# Patient Record
Sex: FEMALE | Race: WHITE
[De-identification: names, ages, dates, MRNs, and addresses within clinical notes are randomized per-mention and may not be internally consistent; named-entity substitution may affect disease eponyms.]

---

## 2021-01-18 ENCOUNTER — HOSPITAL ENCOUNTER (INPATIENT)
Dept: HOSPITAL 56 - MW.OBCHECK | Age: 31
LOS: 2 days | Discharge: HOME | End: 2021-01-20
Attending: OBSTETRICS & GYNECOLOGY | Admitting: OBSTETRICS & GYNECOLOGY
Payer: COMMERCIAL

## 2021-01-18 DIAGNOSIS — Z3A.40: ICD-10-CM

## 2021-01-18 DIAGNOSIS — E66.9: ICD-10-CM

## 2021-01-18 DIAGNOSIS — K21.9: ICD-10-CM

## 2021-01-18 DIAGNOSIS — U07.1: ICD-10-CM

## 2021-01-18 PROCEDURE — 00HU33Z INSERTION OF INFUSION DEVICE INTO SPINAL CANAL, PERCUTANEOUS APPROACH: ICD-10-PCS | Performed by: OBSTETRICS & GYNECOLOGY

## 2021-01-18 PROCEDURE — 3E0R3BZ INTRODUCTION OF ANESTHETIC AGENT INTO SPINAL CANAL, PERCUTANEOUS APPROACH: ICD-10-PCS | Performed by: OBSTETRICS & GYNECOLOGY

## 2021-01-18 PROCEDURE — 3E033VJ INTRODUCTION OF OTHER HORMONE INTO PERIPHERAL VEIN, PERCUTANEOUS APPROACH: ICD-10-PCS | Performed by: OBSTETRICS & GYNECOLOGY

## 2021-01-18 RX ADMIN — Medication PRN TUBE: at 20:58

## 2021-01-18 NOTE — OR
SURGEON:

Stormy Amaya MD

 

DATE OF PROCEDURE:  2021

 

[id number] #[id number] date of procedure 2021.

 

PREOPERATIVE DIAGNOSES:

1. A 30-year-old,  5, para 1-0-3-1 at 40 weeks and 3 days' gestation.

2. Elective induction of labor.

3. Group B Streptococcus positive.

4. Obesity.

5. COVID positive on admission testing.

 

POSTOPERATIVE DIAGNOSES:

1. A 30-year-old,  5, para 2-0-3-2, at 40 weeks and 3 days' gestation.

2. Group B Streptococcus positive.

3. Obesity.

4. COVID positive.

 

PROCEDURE:

Spontaneous vaginal delivery.

 

PRIMARY SURGEON:

Stormy Amaya MD.

 

ANESTHESIA:

Epidural.

 

ESTIMATED BLOOD LOSS:

200 mL.

 

FINDINGS:

Live female infant in cephalic presentation.  Apgar score of 8 and 9 at one and

five minutes respectively.  Placenta intact with 3-vessel cord.  No perineal

lacerations.

 

INDICATIONS:

This is a 30-year-old, G5, P 1-0-3-1, who presented at 40 weeks and 3 days'

gestation for elective induction of labor.  Upon presentation, the cervix was

found to be 2 cm dilated.  Ampicillin was started for GBS prophylaxis, and

Pitocin was started for induction of labor.  The patient progressed to 3 to 4 cm

dilated and received an epidural for pain control.  She had spontaneous rupture

of membranes shortly thereafter with clear fluid noted.  The patient quickly

progressed to complete cervical dilation, and I was called to the room.

 

DESCRIPTION OF PROCEDURE:

I arrived to the room with fetal head at +1 station.  Over the next

contractions, the patient pushed and delivered a live female infant.  Head was

delivered followed quickly by the shoulders and remainder of the body.  Nuchal

cord x1 was reduced after delivery of the body.  The infant was placed on the

maternal abdomen.  After approximately 60 seconds, the cord was clamped and cut.

Cord blood was obtained.  The placenta then delivered intact x3 vessel cord.

The perineum was inspected and no lacerations were noted.  The fundus was firm

below the umbilicus.  The infant and the patient tolerated the delivery well.

 

 

[id number] / MODL

DD:  2021 19:20:19

DT:  2021 20:52:34

Job #:  [id number]

## 2021-01-18 NOTE — PCM.DEL
L & D Note





- General Info


Date of Service: 21


Mother's Due Date: 01/15/21





- Delivery Note


Labor: Induced by Oxytocin


Delivery Outcome: Livebirth


Infant Delivery Method: Spontaneous Vaginal Delivery-Single


Birth Presentation: Vertex


Nuchal Cord: Present, Reduced (after delivery of body)


Anesthesia Type: Epidural


Amniotic Fluid Description: Clear


Episiotomy Type: None


Laceration: None


Placenta: Intact, Spontaneous


Cord: 3 Vessels


Estimated Blood Loss: 300


Resuscitation Needed: No


: Suctioned, Bulb Syringe, Stimulated, Lynd Used


APGAR Score 1 min: 8


APGAR Score 5 min: 9


Delivery Comments (Free Text/Narrative):: 





Live female, weight pending, Namita





- General Info


Date of Service: 21





- Patient Data


Weight - Most Recent: 103.419 kg


I&O - Last 24 Hours: 


                                 Intake & Output











 21





 06:59 14:59 22:59


 


Intake Total  1150 2250


 


Balance  1150 2250











Lab Results Last 24 Hours: 


                         Laboratory Results - last 24 hr











  21 Range/Units





  08:46 08:48 


 


WBC   10.36  (4.0-11.0)  K/uL


 


RBC   4.40  (4.30-5.90)  M/uL


 


Hgb   12.2  (12.0-16.0)  g/dL


 


Hct   37.9  (36.0-46.0)  %


 


MCV   86.1  (80.0-98.0)  fL


 


MCH   27.7  (27.0-32.0)  pg


 


MCHC   32.2  (31.0-37.0)  g/dL


 


RDW Std Deviation   45.1  (28.0-62.0)  fl


 


RDW Coeff of Sallie   15  (11.0-15.0)  %


 


Plt Count   192  (150-400)  K/uL


 


MPV   12.80 H  (7.40-12.00)  fL


 


Nucleated RBC %   0.0  /100WBC


 


Nucleated RBCs #   0  K/uL


 


Blood Type  A POSITIVE   


 


Antibody Screen  NEGATIVE   











Med Orders - Current: 


                               Current Medications





Acetaminophen (Tylenol Extra Strength)  1,000 mg PO Q6H PRN


   PRN Reason: Pain


Benzocaine/Menthol (Dermoplast Pain Relief 20%-0.5% Spray)  78 gm TOP ASDIRECTED

PRN


   PRN Reason: Perineal Comfort Measure


Bisacodyl (Dulcolax)  10 mg RECTAL ONETIME PRN


   PRN Reason: Constipation


Bupropion HCl (Wellbutrin)  100 mg PO BID [facility]


Butorphanol Tartrate (Stadol)  1 mg IVPUSH Q1H PRN


   PRN Reason: Pain


Calcium Carbonate/Glycine (Tums)  1,000 mg PO Q2HR PRN


   PRN Reason: Indigestion


   Last Admin: 21 10:11 Dose:  1,000 mg


   Documented by: 


Carboprost Tromethamine (Hemabate Ds)  250 mcg IM ASDIRECTED PRN


   PRN Reason: Post Partum Hemorrhage


Docusate Sodium (Colace)  100 mg PO BID PRN


   PRN Reason: Constipation


Emollient Ointment (Lansinoh Hpa)  0 gm TOP ASDIRECTED PRN


   PRN Reason: Sore Nipples


Oxytocin/Sodium Chloride (Oxytocin 30 Unit/500 Ml-Ns)  30 unit in 500 mls @ 500 

mls/hr IV TITRATE [facility]


Tranexamic Acid 1,000 mg/ (Sodium Chloride)  110 mls @ 660 mls/hr IV ONETIME PRN


   PRN Reason: Bleeding


Oxytocin/Sodium Chloride (Oxytocin 30 Unit/500 Ml-Ns)  30 unit in 500 mls @ 2 

mls/hr IV TITRATE [facility]; Protocol


   Last Titration: 21 18:58 Dose:  250 munits/min, 250 mls/hr


   Documented by: 


Lactated Ringer's (Ringers, Lactated)  1,000 mls @ 150 mls/hr IV ASDIRECTED [facility]


   Last Admin: 21 17:08 Dose:  150 mls/hr


   Documented by: 


Ampicillin Sodium 1 gm/ Sodium (Chloride)  50 mls @ 100 mls/hr IV Q4H [facility]


   Last Admin: 21 17:15 Dose:  100 mls/hr


   Documented by: 


Ibuprofen (Motrin)  800 mg PO Q8H PRN


   PRN Reason: Pain


Lidocaine HCl (Xylocaine 1%)  50 ml INJECT ONETIME PRN


   PRN Reason: Laceration repair


Methylergonovine Maleate (Methergine)  0.2 mg IM ASDIRECTED PRN


   PRN Reason: Post Partum Hemorrhage


Misoprostol (Cytotec)  200 mcg PO ONETIME PRN


   PRN Reason: Post Partum Hemorrhage


Ondansetron HCl (Zofran)  4 mg IVPUSH Q6H PRN


   PRN Reason: Nausea/Vomiting


   Last Admin: 21 15:25 Dose:  4 mg


   Documented by: 


Oxycodone HCl (Oxycodone)  5 mg PO Q2H PRN


   PRN Reason: Pain


Sodium Chloride (Saline Flush)  10 ml FLUSH ASDIRECTED PRN


   PRN Reason: Keep Vein Open


Sodium Chloride (Saline Flush)  2.5 ml FLUSH ASDIRECTED PRN


   PRN Reason: Keep Vein Open


Sodium Chloride (Normal Saline)  10 ml IV ASDIRECTED PRN


   PRN Reason: IV Use


Sterile Water (Sterile Water For Irrigation)  1,000 ml IRR ASDIRECTED PRN


   PRN Reason: delivery


Terbutaline Sulfate (Brethine)  0.25 mg SUBCUT ASDIRECTED PRN


   PRN Reason: Tacysystole


Witch Hazel (Tucks)  1 pad TOP ASDIRECTED PRN


   PRN Reason: comfort care





Discontinued Medications





Ampicillin Sodium (Ampicillin) Confirm Administered Dose 2 gm IV .STK-MED ONE


   Stop: 21 08:25


Calcium Carbonate/Glycine (Tums) Confirm Administered Dose 1,000 mg .ROUTE .STK-

MED ONE


   Stop: 21 10:03


Ampicillin Sodium 2 gm/ Sodium (Chloride)  100 mls @ 200 mls/hr IV ONETIME ONE


   Stop: 21 09:29


   Last Admin: 21 09:11 Dose:  200 mls/hr


   Documented by: 


Sodium Chloride (Normal Saline) Confirm Administered Dose 100 mls @ as directed 

.ROUTE .STK-MED ONE


   Stop: 21 08:25


Oxytocin/Sodium Chloride (Oxytocin 30 Unit/500 Ml-Ns) Confirm Administered Dose 

30 unit in 500 mls @ as directed .ROUTE .STK-MED ONE


   Stop: 21 08:26


Fentanyl/Bupivacaine HCl (Fentanyl/Bupivacaine/Ns 2 Mcg-0.125% 250 Ml) Confirm 

Administered Dose 250 mls @ as directed .ROUTE .STK-MED ONE


   Stop: 21 14:08











- Problem List & Annotations


(1) Vaginal delivery


SNOMED Code(s): 959227899


   Code(s): O80 - ENCOUNTER FOR FULL-TERM UNCOMPLICATED DELIVERY   Status: Acute

  Current Visit: Yes   





- Problem List Review


Problem List Initiated/Reviewed/Updated: Yes





- My Orders


Last 24 Hours: 


My Active Orders





21 08:14


Patient Status [ADT] Routine 


Bedrest Bathroom Privileges [RC] ASDIRECTED 


Communication Order [RC] ASDIRECTED 


Communication Order [RC] ASDIRECTED 


Fetal Heart Tones [RC] CONTINUOUS 


Fetal Non Stress Test [RC] PER UNIT ROUTINE 


May Shower [RC] ASDIRECTED 


Notify Provider [RC] PRN 


Notify Provider [RC] PRN 


Notify Provider [RC] STAT 


Oxygen Therapy [RC] ASDIRECTED 


Up ad Haleigh [RC] ASDIRECTED 


Vaginal Exam [RC] PRN 


Vaginal Exam [RC] PRN 


Vital Signs [RC] PER UNIT ROUTINE 


Vital Signs [RC] PER UNIT ROUTINE 


Butorphanol [Stadol]   1 mg IVPUSH Q1H PRN 


Carboprost Tromethamine [Hemabate DS]   250 mcg IM ASDIRECTED PRN 


Lidocaine 1% [Xylocaine 1%]   50 ml INJECT ONETIME PRN 


Methylergonovine [Methergine]   0.2 mg IM ASDIRECTED PRN 


Ondansetron [Zofran]   4 mg IVPUSH Q6H PRN 


Sodium Chloride 0.9% [Normal Saline]   10 ml IV ASDIRECTED PRN 


Sodium Chloride 0.9% [Saline Flush]   10 ml FLUSH ASDIRECTED PRN 


Sodium Chloride 0.9% [Saline Flush]   2.5 ml FLUSH ASDIRECTED PRN 


Terbutaline [Brethine]   0.25 mg SUBCUT ASDIRECTED PRN 


Tranexamic Acid [Cyklokapron] 1,000 mg   Sodium Chloride 0.9% [Normal Saline] 

100 ml IV ONETIME 


Water For Irrigation,Sterile [Sterile Water for Irrigation]   1,000 ml IRR 

ASDIRECTED PRN 


miSOPROStoL [Cytotec]   200 mcg PO ONETIME PRN 


Fetal Scalp Electrode [WOMSER] Per Unit Routine 


Peripheral IV Insertion Adult [OM.PC] Routine 


Resuscitation Status Routine 





21 08:15


Lactated Ringers [Ringers, Lactated] 1,000 ml IV ASDIRECTED 


Oxytocin/0.9 % Sodium Chloride [Oxytocin 30 Unit/500 ML-NS] 30 unit in 500 ml IV

TITRATE 


Oxytocin/0.9 % Sodium Chloride [Oxytocin 30 Unit/500 ML-NS] 30 unit in 500 ml IV

TITRATE 


Medication Administration Instruction [OM.PC] Q3H 





21 08:48


RPR (SYPHILIS SERO) W/ RFLX [REF] Routine 





21 09:54


Calcium Carbonate [Tums]   1,000 mg PO Q2HR PRN 





21 10:00


Ampicillin 1 gm   Sodium Chloride 0.9% [Normal Saline] 50 ml IV Q4H 





21 Dinner


Regular Diet [DIET] 





21 19:10


Patient Status [ADT] Routine 


May Shower [RC] ASDIRECTED 


Up ad Haleigh [RC] ASDIRECTED 


Vital Signs [RC] PER UNIT ROUTINE 


Acetaminophen [Tylenol Extra Strength]   1,000 mg PO Q6H PRN 


Benzocaine/Menthol [Dermoplast Pain Relief 20%-0.5% Spray]   78 gm TOP 

ASDIRECTED PRN 


Docusate Sodium [Colace]   100 mg PO BID PRN 


Ibuprofen [Motrin]   800 mg PO Q8H PRN 


Lanolin [Lansinoh HPA]   See Dose Instructions  TOP ASDIRECTED PRN 


bisacodyL [Dulcolax]   10 mg RECTAL ONETIME PRN 


oxyCODONE   5 mg PO Q2H PRN 


witch hazeL [Tucks]   1 pad TOP ASDIRECTED PRN 


Assess Lochia [WOMSER] Per Unit Routine 


Assess Uterine Involution [WOMSER] Per Unit Routine 


Breast Pump [WOMSER] Per Unit Routine 


Ice Therapy [OM.PC] Per Unit Routine 


Perineal Care [OM.PC] Per Unit Routine 


Peripheral IV Discontinue [OM.PC] Routine 


Sitz Bath [OM.PC] Per Unit Routine 





21 19:11


Cooling Warming Measures [RC] ASDIRECTED 





21 21:00


buPROPion [Wellbutrin]   100 mg PO BID 





21 05:11


HEMOGLOBIN/HEMATOCRIT,HH [HEME] Timed 














- Assessment


Assessment:: 





31yo  s/p  at 40w3d





- Plan


Plan:: 





Admit to postpartum unit for routine care.


GBS positive, received adequate antibiotic prophylaxis.


COVID positive.


Continue Wellbutrin.

## 2021-01-18 NOTE — PCM.PREANE
Preanesthetic Assessment





- Procedure


Proposed Procedure: 


Continuous Labor Epidural





- Anesthesia/Transfusion/Family Hx


Anesthesia History: Prior Anesthesia Without Reaction


Transfusion History: No Prior Transfusion(s)





- Review of Systems


General: No Symptoms


Pulmonary: No Symptoms


Cardiovascular: No Symptoms


Gastrointestinal: No Symptoms


Neurological: No Symptoms


Other: Reports: None





- Physical Assessment


Height: 5 ft 0.75 in


Weight: 103.419 kg


ASA Class: 3


Mental Status: Alert & Oriented x3


Airway Class: Mallampati = 1


Dentition: Reports: Normal Dentition


Thyro-Mental Finger Breadths: 3


Mouth Opening Finger Breadths: 3


ROM/Head Extension: Full


Lungs: Clear to Auscultation, Normal Respiratory Effort


Cardiovascular: Regular Rate, Regular Rhythm





- Lab


Values: 





                             Laboratory Last Values











WBC  10.36 K/uL (4.0-11.0)   21  08:48    


 


RBC  4.40 M/uL (4.30-5.90)   21  08:48    


 


Hgb  12.2 g/dL (12.0-16.0)   21  08:48    


 


Hct  37.9 % (36.0-46.0)   21  08:48    


 


MCV  86.1 fL (80.0-98.0)   21  08:48    


 


MCH  27.7 pg (27.0-32.0)   21  08:48    


 


MCHC  32.2 g/dL (31.0-37.0)   21  08:48    


 


RDW Std Deviation  45.1 fl (28.0-62.0)   21  08:48    


 


RDW Coeff of Sallie  15 % (11.0-15.0)   21  08:48    


 


Plt Count  192 K/uL (150-400)   21  08:48    


 


MPV  12.80 fL (7.40-12.00)  H  21  08:48    


 


Nucleated RBC %  0.0 /100WBC  21  08:48    


 


Nucleated RBCs #  0 K/uL  21  08:48    


 


Blood Type  A POSITIVE   21  08:46    


 


Antibody Screen  NEGATIVE   21  08:46    














- Allergies


Allergies/Adverse Reactions: 


                                    Allergies











Allergy/AdvReac Type Severity Reaction Status Date / Time


 


No Known Allergies Allergy   Verified 21 08:13














- Anesthesia Plan


Free Text/Narrative:: 


Continuous Labor Epidural





Pre-Op Medication Ordered: None





- Acknowledgements


Anesthesia Type Planned: Epidural


Pt an Appropriate Candidate for the Planned Anesthesia: Yes


Alternatives and Risks of Anesthesia Discussed w Pt/Guardian: Yes


Pt/Guardian Understands and Agrees with Anesthesia Plan: Yes





PreAnesthesia Questionnaire





- Past Health History


Medical/Surgical History: Denies Medical/Surgical History


HEENT History: Reports: None


Cardiovascular History: Reports: None


Respiratory History: Reports: None


Gastrointestinal History: Reports: GERD


Other Gastrointestinal History: GERD with Pregnancy only


Genitourinary History: Reports: None


OB/GYN History: Reports: Pregnancy, Spontaneous 


: 5


Para: 1


LMP (Approximate): Pregnant


Musculoskeletal History: Reports: None


Neurological History: Reports: Migraines


Psychiatric History: Reports: Anxiety, Depression


Endocrine/Metabolic History: Reports: None


Hematologic History: Reports: None


Immunologic History: Reports: None


Oncologic (Cancer) History: Reports: None


Dermatologic History: Reports: None





- Infectious Disease History


Infectious Disease History: Reports: Chicken Pox





- Past Surgical History


HEENT Surgical History: Reports: Other (See Below)


Other HEENT Surgeries/Procedures: dental work





- SUBSTANCE USE


Tobacco Use Status *Q: Never Tobacco User


Second Hand Smoke Exposure: No





- HOME MEDS


Home Medications: 


                                    Home Meds





Omeprazole 20 mg PO DAILY 20 [History]


Prenatal Vits #93/Iron Fum/FA [Prenatal Formula Tablet] 1 each PO DAILY 20

[History]


buPROPion [Wellbutrin] 100 mg PO BID 20 [History]











- CURRENT (IN HOUSE) MEDS


Current Meds: 





                               Current Medications





Butorphanol Tartrate (Stadol)  1 mg IVPUSH Q1H PRN


   PRN Reason: Pain


Calcium Carbonate/Glycine (Tums)  1,000 mg PO Q2HR PRN


   PRN Reason: Indigestion


   Last Admin: 21 10:11 Dose:  1,000 mg


   Documented by: 


Carboprost Tromethamine (Hemabate Ds)  250 mcg IM ASDIRECTED PRN


   PRN Reason: Post Partum Hemorrhage


Oxytocin/Sodium Chloride (Oxytocin 30 Unit/500 Ml-Ns)  30 unit in 500 mls @ 500 

mls/hr IV TITRATE [facility]


Tranexamic Acid 1,000 mg/ (Sodium Chloride)  110 mls @ 660 mls/hr IV ONETIME PRN


   PRN Reason: Bleeding


Oxytocin/Sodium Chloride (Oxytocin 30 Unit/500 Ml-Ns)  30 unit in 500 mls @ 2 

mls/hr IV TITRATE [facility]; Protocol


   Last Titration: 21 11:15 Dose:  6 munits/min, 6 mls/hr


   Documented by: 


Lactated Ringer's (Ringers, Lactated)  1,000 mls @ 150 mls/hr IV ASDIRECTED [facility]


   Last Admin: 21 14:22 Dose:  999 mls/hr


   Documented by: 


Ampicillin Sodium 1 gm/ Sodium (Chloride)  50 mls @ 100 mls/hr IV Q4H [facility]


   Last Admin: 21 13:42 Dose:  100 mls/hr


   Documented by: 


Lidocaine HCl (Xylocaine 1%)  50 ml INJECT ONETIME PRN


   PRN Reason: Laceration repair


Methylergonovine Maleate (Methergine)  0.2 mg IM ASDIRECTED PRN


   PRN Reason: Post Partum Hemorrhage


Misoprostol (Cytotec)  200 mcg PO ONETIME PRN


   PRN Reason: Post Partum Hemorrhage


Ondansetron HCl (Zofran)  4 mg IVPUSH Q6H PRN


   PRN Reason: Nausea/Vomiting


Sodium Chloride (Saline Flush)  10 ml FLUSH ASDIRECTED PRN


   PRN Reason: Keep Vein Open


Sodium Chloride (Saline Flush)  2.5 ml FLUSH ASDIRECTED PRN


   PRN Reason: Keep Vein Open


Sodium Chloride (Normal Saline)  10 ml IV ASDIRECTED PRN


   PRN Reason: IV Use


Sterile Water (Sterile Water For Irrigation)  1,000 ml IRR ASDIRECTED PRN


   PRN Reason: delivery


Terbutaline Sulfate (Brethine)  0.25 mg SUBCUT ASDIRECTED PRN


   PRN Reason: Tacysystole





Discontinued Medications





Ampicillin Sodium (Ampicillin) Confirm Administered Dose 2 gm IV .STK-MED ONE


   Stop: 21 08:25


Calcium Carbonate/Glycine (Tums) Confirm Administered Dose 1,000 mg .ROUTE .STK-

MED ONE


   Stop: 21 10:03


Ampicillin Sodium 2 gm/ Sodium (Chloride)  100 mls @ 200 mls/hr IV ONETIME ONE


   Stop: 21 09:29


   Last Admin: 21 09:11 Dose:  200 mls/hr


   Documented by: 


Sodium Chloride (Normal Saline) Confirm Administered Dose 100 mls @ as directed 

.ROUTE .[facility] ONE


   Stop: 21 08:25


Oxytocin/Sodium Chloride (Oxytocin 30 Unit/500 Ml-Ns) Confirm Administered Dose 

30 unit in 500 mls @ as directed .ROUTE .[facility] ONE


   Stop: 21 08:26


Fentanyl/Bupivacaine HCl (Fentanyl/Bupivacaine/Ns 2 Mcg-0.125% 250 Ml) Confirm 

Administered Dose 250 mls @ as directed .ROUTE .[facility] ONE


   Stop: 21 14:08

## 2021-01-19 RX ADMIN — Medication PRN TUBE: at 12:01

## 2021-01-19 NOTE — [id number]
Post Anesthesia Note





- EVALUATION WITHIN 48HRS OF ANESTHETIC


Vital Signs in Normal Range: Yes


Patient Participated in Evaluation: Yes


Respiratory Function Stable: Yes


Airway Patent: Yes


Cardiovascular Function Stable: Yes


Hydration Status Stable: Yes


Pain Control Satisfactory: Yes


Nausea and Vomiting Control Satisfactory: Yes


Mental Status Recovered: Yes





- COMMENTS/OBSERVATIONS


Free Text/Narrative:: 


Patient lying in bed with head of bed slightly elevated. She is tolerating signs

and symptoms of a PDPH with oral pain medications and drinking caffeinated Coke.

Encouraged patient to increase fluid intake which may help with some of the 

headache symptoms. Reviewed options of management with patient, including the 

option of an Epidural Blood Patch, which could be tried if her pain becomes 

unmanageable later on and even after discharge. Patient was accepting of plan 

and verbalized understanding. Dr. Amaya and RN present during this discussion.

## 2021-01-19 NOTE — PCM.SN.2
- Free Text/Narrative


Note: 


1/19/2021: Called by RN at 2001 1/18/21 about patient having some bleeding at 

the epidural insertion site. Patient delivered around 1900. Nurse described a 

larger than normal amount of blood oozing from site. I requested that RN hold 

pressure on the site of insertion after removing catheter for a few minutes, to 

place a pressure dressing at the location, and monitor for changes in oozing at 

the site. At this time, RN informed me of patient having complaints of pain in 

back that were similar twinges of pain like when the epidural was placed. She 

was having a headache that was relieved with lying down. During the phone call, 

the RN stated that patient was having increased headache pain and pressure when 

sitting but relieved with lying down. She has been giving Tylenol and Motrin for

pain control. RN also gave the patient Coke which provided some headache relief.

Patient does have a history of migraine headaches but this headache is different

in nature. RN stated that Obstetrician has oxycodone ordered for more severe 

pain if necessary. I explained to nurse that if patient can tolerate the 

symptoms through the night with the current offerings, I would be around in the 

morning to assess the patient and go over options according to what her symptoms

were at that time.





Description of Epidural placement that may explain patient signs and symptoms: 

With first attempt at L2-3, patient had right paresthesia zingers. Needle 

redirected and advanced with again the same results. Tried to redirect again and

was not able to resolve advancement without paresthesia. Opted to move to L 3-4.

With removal of Tuohy and placement of Tuohy stylet, clot formation was ejected 

from needle. Needle flushed with saline. No signs of CSF at this time. Level 3-4

produced same results as previous level. Removed Tuohy, repositioned patient 

with right buttock moved backward very slightly. Landmarks re-identified and 

midline defined. Attempted placement at L2-3 again and advanced to epidural 

space without incident. Loss of resistance with minimal air confirmed epidural 

space. Catheter placed without difficulty. It was noted at this time patient had

oozing of blood from insertion site. Pressure held at insertion site, dressing 

placed, and again pressure held for 1-2 minutes before oozing resided. RN 

informed and aware. No other issues with epidural placement and infusion at this

time. See Anesthesia Record for more details related to placement of epidural. A

student of nurse anesthesia was involved in the patient's care under very direct

guidance. CRNA took over after the initial attempted placement when paresthesias

were produced.





At 0042 on 1/19/2021, RN called to update me on patient status. Patient radha

erating treatment but was still having a headache that was relieved with lying 

down and some back pain. Explained that if patient is doing OK, I would be by in

the morning to assess patient and review possible options as necessary.

## 2021-01-19 NOTE — PCM.PNPP
- General Info


Date of Service: 21


Subjective Update: 





Patient reports headache and back pain since epidural catheter removed, has been

evaluated by Anesthesia.


Functional Status: Reports: Pain Controlled, Tolerating Diet, Ambulating, 

Urinating





- Review of Systems


General: Reports: No Symptoms


HEENT: Reports: Headaches


Pulmonary: Reports: No Symptoms


Cardiovascular: Reports: No Symptoms


Gastrointestinal: Reports: No Symptoms


Genitourinary: Reports: No Symptoms


Musculoskeletal: Reports: No Symptoms


Skin: Reports: No Symptoms


Neurological: Reports: No Symptoms


Psychiatric: Reports: No Symptoms





- Patient Data


Weight - Most Recent: 103.419 kg


I&O - Last 24 Hours: 


                                 Intake & Output











 21





 14:59 22:59 06:59


 


Intake Total 1150 2250 


 


Balance 1150 2250 











Lab Results - Last 24 Hours: 


                         Laboratory Results - last 24 hr











  21 Range/Units





  08:46 08:48 


 


WBC   10.36  (4.0-11.0)  K/uL


 


RBC   4.40  (4.30-5.90)  M/uL


 


Hgb   12.2  (12.0-16.0)  g/dL


 


Hct   37.9  (36.0-46.0)  %


 


MCV   86.1  (80.0-98.0)  fL


 


MCH   27.7  (27.0-32.0)  pg


 


MCHC   32.2  (31.0-37.0)  g/dL


 


RDW Std Deviation   45.1  (28.0-62.0)  fl


 


RDW Coeff of Sallie   15  (11.0-15.0)  %


 


Plt Count   192  (150-400)  K/uL


 


MPV   12.80 H  (7.40-12.00)  fL


 


Nucleated RBC %   0.0  /100WBC


 


Nucleated RBCs #   0  K/uL


 


Blood Type  A POSITIVE   


 


Antibody Screen  NEGATIVE   











Med Orders - Current: 


                               Current Medications





Acetaminophen (Tylenol Extra Strength)  1,000 mg PO Q6H PRN


   PRN Reason: Pain


   Last Admin: 21 03:42 Dose:  1,000 mg


   Documented by: 


Benzocaine/Menthol (Dermoplast Pain Relief 20%-0.5% Spray)  78 gm TOP ASDIRECTED

PRN


   PRN Reason: Perineal Comfort Measure


   Last Admin: 21 20:57 Dose:  1 can


   Documented by: 


Bisacodyl (Dulcolax)  10 mg RECTAL ONETIME PRN


   PRN Reason: Constipation


Bupropion HCl (Wellbutrin)  100 mg PO BID [facility]


Butorphanol Tartrate (Stadol)  1 mg IVPUSH Q1H PRN


   PRN Reason: Pain


Calcium Carbonate/Glycine (Tums)  1,000 mg PO Q2HR PRN


   PRN Reason: Indigestion


   Last Admin: 21 10:11 Dose:  1,000 mg


   Documented by: 


Carboprost Tromethamine (Hemabate Ds)  250 mcg IM ASDIRECTED PRN


   PRN Reason: Post Partum Hemorrhage


Docusate Sodium (Colace)  100 mg PO BID PRN


   PRN Reason: Constipation


Emollient Ointment (Lansinoh Hpa)  0 gm TOP ASDIRECTED PRN


   PRN Reason: Sore Nipples


   Last Admin: 21 20:58 Dose:  1 tube


   Documented by: 


Oxytocin/Sodium Chloride (Oxytocin 30 Unit/500 Ml-Ns)  30 unit in 500 mls @ 500 

mls/hr IV TITRATE [facility]


Tranexamic Acid 1,000 mg/ (Sodium Chloride)  110 mls @ 660 mls/hr IV ONETIME PRN


   PRN Reason: Bleeding


Oxytocin/Sodium Chloride (Oxytocin 30 Unit/500 Ml-Ns)  30 unit in 500 mls @ 2 

mls/hr IV TITRATE [facility]; Protocol


   Last Titration: 21 18:58 Dose:  250 munits/min, 250 mls/hr


   Documented by: 


Lactated Ringer's (Ringers, Lactated)  1,000 mls @ 150 mls/hr IV ASDIRECTED [facility]


   Last Admin: 21 17:08 Dose:  150 mls/hr


   Documented by: 


Ampicillin Sodium 1 gm/ Sodium (Chloride)  50 mls @ 100 mls/hr IV Q4H [facility]


   Last Admin: 21 17:15 Dose:  100 mls/hr


   Documented by: 


Ibuprofen (Motrin)  800 mg PO Q8H PRN


   PRN Reason: Pain


   Last Admin: 21 23:37 Dose:  800 mg


   Documented by: 


Lidocaine HCl (Xylocaine 1%)  50 ml INJECT ONETIME PRN


   PRN Reason: Laceration repair


Methylergonovine Maleate (Methergine)  0.2 mg IM ASDIRECTED PRN


   PRN Reason: Post Partum Hemorrhage


Misoprostol (Cytotec)  200 mcg PO ONETIME PRN


   PRN Reason: Post Partum Hemorrhage


Ondansetron HCl (Zofran)  4 mg IVPUSH Q6H PRN


   PRN Reason: Nausea/Vomiting


   Last Admin: 21 15:25 Dose:  4 mg


   Documented by: 


Oxycodone HCl (Oxycodone)  5 mg PO Q2H PRN


   PRN Reason: Pain


Sodium Chloride (Saline Flush)  10 ml FLUSH ASDIRECTED PRN


   PRN Reason: Keep Vein Open


Sodium Chloride (Saline Flush)  2.5 ml FLUSH ASDIRECTED PRN


   PRN Reason: Keep Vein Open


Sodium Chloride (Normal Saline)  10 ml IV ASDIRECTED PRN


   PRN Reason: IV Use


Sterile Water (Sterile Water For Irrigation)  1,000 ml IRR ASDIRECTED PRN


   PRN Reason: delivery


Terbutaline Sulfate (Brethine)  0.25 mg SUBCUT ASDIRECTED PRN


   PRN Reason: Tacysystole


Witch Hazel (Tucks)  1 pad TOP ASDIRECTED PRN


   PRN Reason: comfort care


   Last Admin: 21 20:58 Dose:  1 tub


   Documented by: 





Discontinued Medications





Ampicillin Sodium (Ampicillin) Confirm Administered Dose 2 gm IV .STK-MED ONE


   Stop: 21 08:25


Calcium Carbonate/Glycine (Tums) Confirm Administered Dose 1,000 mg .ROUTE .STK-

MED ONE


   Stop: 21 10:03


Ampicillin Sodium 2 gm/ Sodium (Chloride)  100 mls @ 200 mls/hr IV ONETIME ONE


   Stop: 21 09:29


   Last Admin: 21 09:11 Dose:  200 mls/hr


   Documented by: 


Sodium Chloride (Normal Saline) Confirm Administered Dose 100 mls @ as directed 

.ROUTE .STK-MED ONE


   Stop: 21 08:25


Oxytocin/Sodium Chloride (Oxytocin 30 Unit/500 Ml-Ns) Confirm Administered Dose 

30 unit in 500 mls @ as directed .ROUTE .STK-MED ONE


   Stop: 21 08:26


Fentanyl/Bupivacaine HCl (Fentanyl/Bupivacaine/Ns 2 Mcg-0.125% 250 Ml) Confirm 

Administered Dose 250 mls @ as directed .ROUTE .STK-MED ONE


   Stop: 21 14:08











- Infant Interaction


Infant Disposition, Postpartum: [place] in Room with Family


Infant Feeding:  Infant; Nursed Well


Support Person: 





- Postpartum Recovery Exam


Fundal Tone: Firm


Fundal Level: 1 Fingerbreadths Below Umbilicus


Fundal Placement: Midline


Lochia Amount: Small


Lochia Color: Rubra/Red


Bladder Status: Nonpalpable


Urinary Elimination: Other (see below)


Other Urinary Elimination, Postpartum: Due to void after delivery





- Exam


General: Alert, Oriented


Neck: Supple


Lungs: Normal Respiratory Effort


GI/Abdominal Exam: Soft, Non-Tender, No Distention


Extremities: No Pedal Edema


Skin: Warm, Dry, Intact


Neurological: No New Focal Deficit


Psy/Mental Status: Alert, Normal Affect, Normal Mood





- Problem List & Annotations


(1) Vaginal delivery


SNOMED Code(s): 616871695


   Code(s): O80 - ENCOUNTER FOR FULL-TERM UNCOMPLICATED DELIVERY   Status: Acute

  Current Visit: Yes   





- Problem List Review


Problem List Initiated/Reviewed/Updated: Yes





- My Orders


Last 24 Hours: 


My Active Orders





21 08:14


Patient Status [ADT] Routine 


Bedrest Bathroom Privileges [RC] ASDIRECTED 


Butorphanol [Stadol]   1 mg IVPUSH Q1H PRN 


Carboprost Tromethamine [Hemabate DS]   250 mcg IM ASDIRECTED PRN 


Lidocaine 1% [Xylocaine 1%]   50 ml INJECT ONETIME PRN 


Methylergonovine [Methergine]   0.2 mg IM ASDIRECTED PRN 


Ondansetron [Zofran]   4 mg IVPUSH Q6H PRN 


Sodium Chloride 0.9% [Normal Saline]   10 ml IV ASDIRECTED PRN 


Sodium Chloride 0.9% [Saline Flush]   10 ml FLUSH ASDIRECTED PRN 


Sodium Chloride 0.9% [Saline Flush]   2.5 ml FLUSH ASDIRECTED PRN 


Terbutaline [Brethine]   0.25 mg SUBCUT ASDIRECTED PRN 


Tranexamic Acid [Cyklokapron] 1,000 mg   Sodium Chloride 0.9% [Normal Saline] 

100 ml IV ONETIME 


Water For Irrigation,Sterile [Sterile Water for Irrigation]   1,000 ml IRR 

ASDIRECTED PRN 


miSOPROStoL [Cytotec]   200 mcg PO ONETIME PRN 


Fetal Scalp Electrode [WOMSER] Per Unit Routine 


Peripheral IV Insertion Adult [OM.PC] Routine 


Resuscitation Status Routine 





21 08:15


Lactated Ringers [Ringers, Lactated] 1,000 ml IV ASDIRECTED 


Oxytocin/0.9 % Sodium Chloride [Oxytocin 30 Unit/500 ML-NS] 30 unit in 500 ml IV

TITRATE 


Oxytocin/0.9 % Sodium Chloride [Oxytocin 30 Unit/500 ML-NS] 30 unit in 500 ml IV

TITRATE 


Medication Administration Instruction [OM.PC] Q3H 





21 08:48


RPR (SYPHILIS SERO) W/ RFLX [REF] Routine 





21 09:54


Calcium Carbonate [Tums]   1,000 mg PO Q2HR PRN 





21 10:00


Ampicillin 1 gm   Sodium Chloride 0.9% [Normal Saline] 50 ml IV Q4H 





21 Dinner


Regular Diet [DIET] 





21 19:10


Patient Status [ADT] Routine 


May Shower [RC] ASDIRECTED 


Up ad [place]igh [RC] ASDIRECTED 


Vital Signs [RC] PER UNIT ROUTINE 


Acetaminophen [Tylenol Extra Strength]   1,000 mg PO Q6H PRN 


Benzocaine/Menthol [Dermoplast Pain Relief 20%-0.5% Spray]   78 gm TOP 

ASDIRECTED PRN 


Docusate Sodium [Colace]   100 mg PO BID PRN 


Ibuprofen [Motrin]   800 mg PO Q8H PRN 


Lanolin [Lansinoh HPA]   See Dose Instructions  TOP ASDIRECTED PRN 


bisacodyL [Dulcolax]   10 mg RECTAL ONETIME PRN 


oxyCODONE   5 mg PO Q2H PRN 


witch hazeL [Tucks]   1 pad TOP ASDIRECTED PRN 


Assess Lochia [WOMSER] Per Unit Routine 


Assess Uterine Involution [WOMSER] Per Unit Routine 


Breast Pump [WOMSER] Per Unit Routine 


Ice Therapy [OM.PC] Per Unit Routine 


Perineal Care [OM.PC] Per Unit Routine 


Peripheral IV Discontinue [OM.PC] Routine 


Sitz Bath [OM.PC] Per Unit Routine 





21 21:00


buPROPion [Wellbutrin]   100 mg PO BID 





21 05:11


HEMOGLOBIN/HEMATOCRIT,HH [HEME] Routine 





21 05:46


Ready for Discharge [RC] PER UNIT ROUTINE 














- Assessment


Assessment:: 





31yo  s/p  at 40w3d, PPD#1





- Plan


Plan:: 





Patient desires discharge home today; reviewed discharge instructions.


Continue Wellbutrin.


Received adequate GBS prophylaxis.


Will follow-up with Anesthesia regarding headache if unimproved by conservative 

measures.

## 2021-01-20 NOTE — PCM.PNPP
- General Info


Date of Service: 21


Subjective Update: 





Patient states spinal headache has returned, drinking caffeine now which helped 

yesterday.


Breastfeeding doing well.


Functional Status: Reports: Pain Controlled, Tolerating Diet, Ambulating, 

Urinating





- Review of Systems


General: Reports: No Symptoms


HEENT: Reports: No Symptoms


Pulmonary: Reports: No Symptoms


Cardiovascular: Reports: No Symptoms


Gastrointestinal: Reports: No Symptoms


Genitourinary: Reports: No Symptoms


Musculoskeletal: Reports: No Symptoms


Skin: Reports: No Symptoms


Neurological: Reports: No Symptoms


Psychiatric: Reports: No Symptoms





- General Info


Date of Service: 21





- Patient Data


Vital Signs - Most Recent: 


                                Last Vital Signs











Temp  35.7 C L  21 05:00


 


Pulse  70   21 05:00


 


Resp  16   21 05:00


 


BP  111/83   21 05:00


 


Pulse Ox  95   21 05:00











Weight - Most Recent: 103.419 kg


Med Orders - Current: 


                               Current Medications





Acetaminophen (Tylenol Extra Strength)  1,000 mg PO Q6H PRN


   PRN Reason: Pain


   Last Admin: 21 18:51 Dose:  1,000 mg


   Documented by: 


Benzocaine/Menthol (Dermoplast Pain Relief 20%-0.5% Spray)  78 gm TOP ASDIRECTED

PRN


   PRN Reason: Perineal Comfort Measure


   Last Admin: 21 20:57 Dose:  1 can


   Documented by: 


Bisacodyl (Dulcolax)  10 mg RECTAL ONETIME PRN


   PRN Reason: Constipation


Bupropion HCl (Wellbutrin Xl)  150 mg PO BEDTIME [facility]


   Last Admin: 21 21:00 Dose:  150 mg


   Documented by: 


Butorphanol Tartrate (Stadol)  1 mg IVPUSH Q1H PRN


   PRN Reason: Pain


Calcium Carbonate/Glycine (Tums)  1,000 mg PO Q2HR PRN


   PRN Reason: Indigestion


   Last Admin: 21 10:11 Dose:  1,000 mg


   Documented by: 


Carboprost Tromethamine (Hemabate Ds)  250 mcg IM ASDIRECTED PRN


   PRN Reason: Post Partum Hemorrhage


Docusate Sodium (Colace)  100 mg PO BID PRN


   PRN Reason: Constipation


Emollient Ointment (Lansinoh Hpa)  0 gm TOP ASDIRECTED PRN


   PRN Reason: Sore Nipples


   Last Admin: 21 12:01 Dose:  1 tube


   Documented by: 


Oxytocin/Sodium Chloride (Oxytocin 30 Unit/500 Ml-Ns)  30 unit in 500 mls @ 500 

mls/hr [facility]


Tranexamic Acid 1,000 mg/ (Sodium Chloride)  110 mls @ 660 mls/hr IV ONETIME PRN


   PRN Reason: Bleeding


Oxytocin/Sodium Chloride (Oxytocin 30 Unit/500 Ml-Ns)  30 unit in 500 mls @ 2 

mls/hr IV TITRATE [facility]; Protocol


   Last Titration: 21 18:58 Dose:  250 munits/min, 250 mls/hr


   Documented by: 


Lactated Ringer's (Ringers, Lactated)  1,000 mls @ 150 mls/hr IV ASDIRECTED [facility]


   Last Admin: 21 17:08 Dose:  150 mls/hr


   Documented by: 


Ampicillin Sodium 1 gm/ Sodium (Chloride)  50 mls @ 100 mls/hr IV [facility]4H [facility]


   Last Admin: 21 22:36 Dose:  Not Given


   Documented by: 


Ibuprofen (Motrin)  800 mg PO Q8H PRN


   PRN Reason: Pain


   Last Admin: 21 00:10 Dose:  800 mg


   Documented by: 


Lidocaine HCl (Xylocaine 1%)  50 ml INJECT ONETIME PRN


   PRN Reason: Laceration repair


Methylergonovine Maleate (Methergine)  0.2 mg IM ASDIRECTED PRN


   PRN Reason: Post Partum Hemorrhage


Misoprostol (Cytotec)  200 mcg PO ONETIME PRN


   PRN Reason: Post Partum Hemorrhage


Ondansetron HCl (Zofran)  4 mg IVPUSH Q6H PRN


   PRN Reason: Nausea/Vomiting


   Last Admin: 21 15:25 Dose:  4 mg


   Documented by: 


Oxycodone HCl (Oxycodone)  5 mg PO Q2H PRN


   PRN Reason: Pain


Sodium Chloride (Saline Flush)  10 ml FLUSH ASDIRECTED PRN


   PRN Reason: Keep Vein Open


Sodium Chloride (Saline Flush)  2.5 ml FLUSH ASDIRECTED PRN


   PRN Reason: Keep Vein Open


Sodium Chloride (Normal Saline)  10 ml IV ASDIRECTED PRN


   PRN Reason: IV Use


Sterile Water (Sterile Water For Irrigation)  1,000 ml IRR ASDIRECTED PRN


   PRN Reason: delivery


Terbutaline Sulfate (Brethine)  0.25 mg SUBCUT ASDIRECTED PRN


   PRN Reason: Tacysystole


Witch Hazel (Tucks)  1 pad TOP ASDIRECTED PRN


   PRN Reason: comfort care


   Last Admin: 21 20:58 Dose:  1 tub


   Documented by: 





Discontinued Medications





Ampicillin Sodium (Ampicillin) Confirm Administered Dose 2 gm IV .STK-MED ONE


   Stop: 21 08:25


   Last Admin: 21 22:34 Dose:  Not Given


   Documented by: 


Calcium Carbonate/Glycine (Tums) Confirm Administered Dose 1,000 mg .ROUTE .STK-

MED ONE


   Stop: 21 10:03


   Last Admin: 21 22:35 Dose:  Not Given


   Documented by: 


Ampicillin Sodium 2 gm/ Sodium (Chloride)  100 mls @ 200 mls/hr IV ONETIME ONE


   Stop: 21 09:29


   Last Admin: 21 09:11 Dose:  200 mls/hr


   Documented by: 


Sodium Chloride (Normal Saline) Confirm Administered Dose 100 mls @ as directed 

.ROUTE .STK-MED ONE


   Stop: 21 08:25


   Last Admin: 21 22:35 Dose:  Not Given


   Documented by: 


Oxytocin/Sodium Chloride (Oxytocin 30 Unit/500 Ml-Ns) Confirm Administered Dose 

30 unit in 500 mls @ as directed .ROUTE .STK-MED ONE


   Stop: 21 08:26


   Last Admin: 21 22:35 Dose:  Not Given


   Documented by: 


Fentanyl/Bupivacaine HCl (Fentanyl/Bupivacaine/Ns 2 Mcg-0.125% 250 Ml) Confirm 

Administered Dose 250 mls @ as directed .ROUTE .STK-MED ONE


   Stop: 21 14:08


   Last Admin: 21 22:35 Dose:  Not Given


   Documented by: 











- Infant Interaction


Infant Disposition, Postpartum: [place] in Room with Family


Infant Feeding:  Infant; Nursed Well


Support Person: 





- Postpartum Recovery Exam


Fundal Tone: Firm


Fundal Level: 2 Fingerbreadths Below Umbilicus


Fundal Placement: Midline


Lochia Amount: Scant


Lochia Color: Rubra/Red


Bladder Status: Voiding


Urinary Elimination: Voided


Other Urinary Elimination, Postpartum: Due to void after delivery





- Exam


General: Alert, Oriented


Neck: Supple


Lungs: Normal Respiratory Effort


GI/Abdominal Exam: Soft, Non-Tender, No Distention


Extremities: Non-Tender, Pedal Edema (trace)


Skin: Warm, Dry, Intact


Neurological: No New Focal Deficit


Psy/Mental Status: Alert, Normal Affect, Normal Mood





- Problem List & Annotations


(1) Vaginal delivery


SNOMED Code(s): 177458007


   Code(s): O80 - ENCOUNTER FOR FULL-TERM UNCOMPLICATED DELIVERY   Status: Acute

  Current Visit: Yes   





- Problem List Review


Problem List Initiated/Reviewed/Updated: Yes





- My Orders


Last 24 Hours: 


My Active Orders





21 21:00


buPROPion [Wellbutrin XL]   150 mg PO BEDTIME 














- Assessment


Assessment:: 





29yo  s/p  at 40w3d, PPD#2





- Plan


Plan:: 





Patient desires discharge home today; reviewed discharge instructions.


Continue Wellbutrin.


Will follow-up with Anesthesia regarding headache if unimproved by conservative 

measures.